# Patient Record
Sex: FEMALE | Race: BLACK OR AFRICAN AMERICAN | HISPANIC OR LATINO | Employment: FULL TIME | URBAN - METROPOLITAN AREA
[De-identification: names, ages, dates, MRNs, and addresses within clinical notes are randomized per-mention and may not be internally consistent; named-entity substitution may affect disease eponyms.]

---

## 2023-08-26 ENCOUNTER — ANCILLARY PROCEDURE (OUTPATIENT)
Dept: GENERAL RADIOLOGY | Facility: CLINIC | Age: 28
End: 2023-08-26
Attending: PHYSICIAN ASSISTANT
Payer: COMMERCIAL

## 2023-08-26 ENCOUNTER — OFFICE VISIT (OUTPATIENT)
Dept: URGENT CARE | Facility: URGENT CARE | Age: 28
End: 2023-08-26
Payer: COMMERCIAL

## 2023-08-26 VITALS
OXYGEN SATURATION: 99 % | DIASTOLIC BLOOD PRESSURE: 66 MMHG | WEIGHT: 183.5 LBS | SYSTOLIC BLOOD PRESSURE: 106 MMHG | RESPIRATION RATE: 16 BRPM | TEMPERATURE: 99.2 F | HEART RATE: 96 BPM

## 2023-08-26 DIAGNOSIS — M79.632 PAIN OF LEFT FOREARM: ICD-10-CM

## 2023-08-26 DIAGNOSIS — M25.522 LEFT ELBOW PAIN: Primary | ICD-10-CM

## 2023-08-26 DIAGNOSIS — M25.522 LEFT ELBOW PAIN: ICD-10-CM

## 2023-08-26 PROCEDURE — 99203 OFFICE O/P NEW LOW 30 MIN: CPT | Performed by: PHYSICIAN ASSISTANT

## 2023-08-26 PROCEDURE — 73080 X-RAY EXAM OF ELBOW: CPT | Mod: TC | Performed by: RADIOLOGY

## 2023-08-26 PROCEDURE — 73090 X-RAY EXAM OF FOREARM: CPT | Mod: TC | Performed by: RADIOLOGY

## 2023-08-26 NOTE — PROGRESS NOTES
ASSESSMENT/PLAN:    8/27/23 -PLEASE SEE BELOW TELEPHONE ENCOUNTER ADDENDUM:     Timothy MORRIS    8/27/23  9:49 AM  Note     Please call patient to let her know the radiologist reading of her x-rays from yesterday's urgent care visit--show the radiologist does suspect a fracture (broken bone). The radiologist does not see a definite fracture, but highly suspects a fracture.      The area the radiologist is concerned about is the very same area I showed patient on her x-rays yesterday.      PLAN :     I advise she follow-up with an orthopedic doctor tomorrow for further evaluation and treatment.      If she is unable to get an appointment with the referral I provided, she could consider going to an orthopedic urgent care (such as [facility])                  Narrative & Impression   EXAM: XR FOREARM LEFT 2 VIEWS, XR ELBOW LEFT G/E 3 VIEWS  LOCATION: [facility]  DATE: 8/26/2023     INDICATION: Elbow pain.  COMPARISON: None.                                                                      IMPRESSION: Large elbow joint effusion, indicating an intra-articular fracture in the elbow, most likely involving the radial head or neck. The fracture itself is not definitely visualized on either the forearm or elbow exam. Joint alignment is normal.   No other acute bone or joint abnormality in the left elbow or forearm.                (M25.522) Left elbow pain  (primary encounter diagnosis)    MDM: Acute pain and swelling left elbow related to fall.  Sprain, bony contusion and occult fracture are in the differential.  No definite fracture on x-ray by my impression today.  Patient is educated about potential occult fractures and delayed x-ray findings.  I did review 1 area of radial head on x-ray with patient and her sister today (and have advised she watch closely for the follow-up radiologist report).    Plan: XR Elbow Left G/E 3 Views, Orthopedic         Referral                August 26,  "2023 [facility]:     1. Please use the sling I provided, intermittently, over the next several days.  Intentionally remove brace frequently throughout the day to work on gentle range of motion exercises, as tolerated.     2. Ice and elevate your elbow/forearm over the next 2-3 days (20 min every 2-3 hours)     3. You may use Ibuprofen (600 mg), three times daily, with food, for next 3-5 days as tolerated.      4.  Follow-up with primary care provider or orthopedic doctor (a referral is provided)  if symptoms not significantly improving over the next week.  Follow-up sooner if any worsening of symptoms or if any new symptoms develop (see handout provided).     5. Follow-up immediately for medical re-evaluation if you develop any increased pain, redness or swelling.  Follow-up immediately if fingers or hand becomes cold, blue, numb, or tingly.       (M79.632) Pain of left forearm    Plan: XR Forearm Left 2 Views, Orthopedic          Referral        This progress note has been dictated, with use of voice recognition software. Any grammatical, typographical, or context errors are unintentional and inherent to use of voice recognition software.    -------------  Chief Complaint   Patient presents with    Arm Injury     Today, fell backwards while roller skating and lands on left elbow-pain in forearm, swelling     Lisa Montenegro is a right hand dominant 27-year-old female who presents to urgent care today for evaluation of left elbow and forearm pain and swelling related to an injury she sustained while rollerskating approximately 2.5 hours ago.    HPI: Patient states she fell backwards (onto a wooden floor) while rollerskating today.  It sounds like her arm was in a \"bent elbow) position when she fell to the floor.  She reports limited range of motion, pain, and swelling since the above.    With her elbow in a 90 degree flexed position patient states pain is 1/10  When she attempts any pronation " supination patient reports pain is 2.5/10  When she attempts to fully flex or fully extend her elbow she reports pain is 8/10    Approximately 2 pm--about 2.5 hours ago     Fell backwards--onto wooden floor with bent elbow/struck ulnar side of wrist hard and also felt crack in left elbow     Specifically denies any cold, blue, icy feeling fingers.  Denies any numbness or tingling of affected upper extremity.  No associated head injury, neck injury, or other acute injuries.    OBJECTIVE:  /66   Pulse 96   Temp 99.2  F (37.3  C)   Resp 16   Wt 83.2 kg (183 lb 8 oz)   SpO2 99%      GENERAL:  Alert. NAD.     LUE Exam: No obvious deformity.  Patient does have generalized swelling over elbow.  No bruising.  No lacerations.  Patient is globally tender over the elbow joint and proximal ulna.  She has very limited range of motion at the elbow due to pain.  Therefore, I intentionally did not further pursue range of motion today.  Patient is able to just demonstrate FROM wrist, hand, and fingers. Exam of shoulder, wrist, hand, digits and forearm is otherwise unremarkable.      SKIN: No laceration, abrasion, hematoma, bruising or swelling     NEURO: Sensation intact to light touch along entire UE and into all fingertips (including radial aspect, ulnar aspect and fingertip) today. Alert and oriented.  Normal speech and mentation.  CN II/XII grossly intact.  Gait within normal limits.        VASCULAR: No compromise to distal circulation. Brachial and radial pulses are 2+ and equal to that of unaffected extremity. Good/brisk capillary refill to all digits confirmed today.      XR LEFT ELBOW AND LEFT WRIST:  I reviewed my impression--No definite acute fracture--but I did show patient the radial head area on x-ray today and advised she will need to watch to see what radiologist thinks of that specific area.  No acute malalignment--along with actual x-ray images, with patient during her office visit today.  Radiologist  over-read pending. Patient will need to be called if there area any acute findings on radiologist over-read.

## 2023-08-26 NOTE — PATIENT INSTRUCTIONS
August 26, 2023 [facility] Plan:     1. Please use the sling I provided, intermittently, over the next several days.  Intentionally remove brace frequently throughout the day to work on gentle range of motion exercises, as tolerated.     2. Ice and elevate your elbow/forearm over the next 2-3 days (20 min every 2-3 hours)     3. You may use Ibuprofen (600 mg), three times daily, with food, for next 3-5 days as tolerated.      4.  Follow-up with primary care provider or orthopedic doctor (a referral is provided)  if symptoms not significantly improving over the next week.  Follow-up sooner if any worsening of symptoms or if any new symptoms develop (see handout provided).     5. Follow-up immediately for medical re-evaluation if you develop any increased pain, redness or swelling.  Follow-up immediately if fingers or hand becomes cold, blue, numb, or tingly.

## 2023-08-27 ENCOUNTER — NURSE TRIAGE (OUTPATIENT)
Dept: NURSING | Facility: CLINIC | Age: 28
End: 2023-08-27
Payer: COMMERCIAL

## 2023-08-27 ENCOUNTER — TELEPHONE (OUTPATIENT)
Dept: URGENT CARE | Facility: URGENT CARE | Age: 28
End: 2023-08-27
Payer: COMMERCIAL

## 2023-08-27 NOTE — TELEPHONE ENCOUNTER
Called and left another  for pt to call back.    RD Browne, [facility] August 27, 2023 9:52 AM

## 2023-08-27 NOTE — TELEPHONE ENCOUNTER
Pt called back and information was relayed to pt by [facility]NA. No further action needed.    RD Browne, [facility] August 27, 2023 12:08 PM

## 2023-08-27 NOTE — TELEPHONE ENCOUNTER
Please call patient to let her know the radiologist reading of her x-rays from yesterday's urgent care visit--show the radiologist does suspect a fracture (broken bone). The radiologist does not see a definite fracture, but highly suspects a fracture.     The area the radiologist is concerned about is the very same area I showed patient on her x-rays yesterday.     PLAN :    I advise she follow-up with an orthopedic doctor tomorrow for further evaluation and treatment.     If she is unable to get an appointment with the referral I provided, she could consider going to an orthopedic urgent care (such as [facility])             Narrative & Impression   EXAM: XR FOREARM LEFT 2 VIEWS, XR ELBOW LEFT G/E 3 VIEWS  LOCATION: [facility]  DATE: 8/26/2023     INDICATION: Elbow pain.  COMPARISON: None.                                                                      IMPRESSION: Large elbow joint effusion, indicating an intra-articular fracture in the elbow, most likely involving the radial head or neck. The fracture itself is not definitely visualized on either the forearm or elbow exam. Joint alignment is normal.   No other acute bone or joint abnormality in the left elbow or forearm.

## 2023-08-27 NOTE — TELEPHONE ENCOUNTER
Pt is phoning stating that she missed a phone call concerning her recent x-ray of her left arm     Writer can see providers and radiologists note and relayed information to pt as follows:    Please call patient to let her know the radiologist reading of her x-rays from yesterday's urgent care visit--show the radiologist does suspect a fracture (broken bone). The radiologist does not see a definite fracture, but highly suspects a fracture.      The area the radiologist is concerned about is the very same area I showed patient on her x-rays yesterday.      PLAN :     I advise she follow-up with an orthopedic doctor tomorrow for further evaluation and treatment.      If she is unable to get an appointment with the referral I provided, she could consider going to an orthopedic urgent care (such as [facility])              Narrative & Impression   EXAM: XR FOREARM LEFT 2 VIEWS, XR ELBOW LEFT G/E 3 VIEWS  LOCATION: [facility]  DATE: 8/26/2023     INDICATION: Elbow pain.  COMPARISON: None.                                                                      IMPRESSION: Large elbow joint effusion, indicating an intra-articular fracture in the elbow, most likely involving the radial head or neck. The fracture itself is not definitely visualized on either the forearm or elbow exam. Joint alignment is normal.   No other acute bone or joint abnormality in the left elbow or forearm.      Writer also went over where the closest [facility] is for pt and provided phone number and address.    No triage       Yisel Polk RN  [facility] Nurse Advisor  11:38 AM 8/27/2023      Reason for Disposition   Health Information question, no triage required and triager able to answer question    Additional Information   Negative: [1] Caller is not with the adult (patient) AND [2] reporting urgent symptoms   Negative: Lab result questions   Negative: Medication questions   Negative: Caller can't  be reached by phone   Negative: Caller has already spoken to PCP or another triager   Negative: RN needs further essential information from caller in order to complete triage   Negative: Requesting regular office appointment   Negative: [1] Caller requesting NON-URGENT health information AND [2] PCP's office is the best resource    Protocols used: Information Only Call - No Triage-A-

## 2023-09-21 ENCOUNTER — OFFICE VISIT (OUTPATIENT)
Dept: FAMILY MEDICINE | Facility: CLINIC | Age: 28
End: 2023-09-21
Payer: COMMERCIAL

## 2023-09-21 VITALS
WEIGHT: 180.9 LBS | TEMPERATURE: 98.5 F | RESPIRATION RATE: 14 BRPM | BODY MASS INDEX: 32.05 KG/M2 | OXYGEN SATURATION: 97 % | HEART RATE: 64 BPM | DIASTOLIC BLOOD PRESSURE: 60 MMHG | HEIGHT: 63 IN | SYSTOLIC BLOOD PRESSURE: 106 MMHG

## 2023-09-21 DIAGNOSIS — Z12.4 CERVICAL CANCER SCREENING: ICD-10-CM

## 2023-09-21 DIAGNOSIS — Z11.4 SCREENING FOR HIV (HUMAN IMMUNODEFICIENCY VIRUS): Primary | ICD-10-CM

## 2023-09-21 DIAGNOSIS — Z00.00 ROUTINE GENERAL MEDICAL EXAMINATION AT A HEALTH CARE FACILITY: ICD-10-CM

## 2023-09-21 DIAGNOSIS — Z11.59 NEED FOR HEPATITIS C SCREENING TEST: ICD-10-CM

## 2023-09-21 PROCEDURE — 90471 IMMUNIZATION ADMIN: CPT | Performed by: NURSE PRACTITIONER

## 2023-09-21 PROCEDURE — 99395 PREV VISIT EST AGE 18-39: CPT | Mod: 25 | Performed by: NURSE PRACTITIONER

## 2023-09-21 PROCEDURE — 90472 IMMUNIZATION ADMIN EACH ADD: CPT | Performed by: NURSE PRACTITIONER

## 2023-09-21 PROCEDURE — 90715 TDAP VACCINE 7 YRS/> IM: CPT | Performed by: NURSE PRACTITIONER

## 2023-09-21 PROCEDURE — 90651 9VHPV VACCINE 2/3 DOSE IM: CPT | Performed by: NURSE PRACTITIONER

## 2023-09-21 PROCEDURE — G0145 SCR C/V CYTO,THINLAYER,RESCR: HCPCS | Performed by: NURSE PRACTITIONER

## 2023-09-21 ASSESSMENT — ENCOUNTER SYMPTOMS
PARESTHESIAS: 0
WEAKNESS: 0
NERVOUS/ANXIOUS: 0
MYALGIAS: 0
ARTHRALGIAS: 0
JOINT SWELLING: 0
DIARRHEA: 0
DYSURIA: 0
HEADACHES: 0
EYE PAIN: 0
PALPITATIONS: 0
DIZZINESS: 0
ABDOMINAL PAIN: 0
HEMATURIA: 0
SORE THROAT: 0
HEARTBURN: 0
CONSTIPATION: 0
BREAST MASS: 0
HEMATOCHEZIA: 0
COUGH: 0
NAUSEA: 0
CHILLS: 0
SHORTNESS OF BREATH: 0
FEVER: 0
FREQUENCY: 0

## 2023-09-21 ASSESSMENT — PAIN SCALES - GENERAL: PAINLEVEL: NO PAIN (0)

## 2023-09-21 NOTE — PROGRESS NOTES
SUBJECTIVE:   CC: Lisa Ba is an 28 year old who presents for preventive health visit.       2023     7:57 AM   Additional Questions   Roomed by RD Martinez   Accompanied by Self       Healthy Habits:     Getting at least 3 servings of Calcium per day:  Yes    Bi-annual eye exam:  NO    Dental care twice a year:  NO    Sleep apnea or symptoms of sleep apnea:  None    Diet:  Breakfast skipped    Frequency of exercise:  1 day/week    Duration of exercise:  15-30 minutes    Taking medications regularly:  Not Applicable    Medication side effects:  Not applicable    Additional concerns today:  No    She is here for physical has no concerns  No breast cancer in family  No sexually active, will get HPV and Today.    Today's PHQ-2 Score:       2023     7:44 AM   PHQ-2 (  [employer])   Q1: Little interest or pleasure in doing things 0   Q2: Feeling down, depressed or hopeless 0   PHQ-2 Score 0   Q1: Little interest or pleasure in doing things Not at all   Q2: Feeling down, depressed or hopeless Not at all   PHQ-2 Score 0               Have you ever done Advance Care Planning? (For example, a Health Directive, POLST, or a discussion with a medical provider or your loved ones about your wishes): No, advance care planning information given to patient to review.  Patient declined advance care planning discussion at this time.    Social History     Tobacco Use    Smoking status: Never    Smokeless tobacco: Never   Substance Use Topics    Alcohol use: Not on file             2023     7:44 AM   Alcohol Use   Prescreen: >3 drinks/day or >7 drinks/week? No     Reviewed orders with patient.  Reviewed health maintenance and updated orders accordingly - Yes  Lab work is in process    Breast Cancer Screenin/21/2023     7:51 AM   Breast CA Risk Assessment (FHS-7)   Do you have a family history of breast, colon, or ovarian cancer? No / Unknown       click delete button to remove this line now  Patient  "under 40 years of age: Routine Mammogram Screening not recommended.   Pertinent mammograms are reviewed under the imaging tab.    History of abnormal Pap smear: NO - age 21-29 PAP every 3 years recommended     Reviewed and updated as needed this visit by clinical staff   Tobacco  Allergies  Meds              Reviewed and updated as needed this visit by Provider     Meds  Problems  Med Hx  Surg Hx          No past medical history on file.   No past surgical history on file.    Review of Systems   Constitutional:  Negative for chills and fever.   HENT:  Negative for congestion, ear pain, hearing loss and sore throat.    Eyes:  Negative for pain and visual disturbance.   Respiratory:  Negative for cough and shortness of breath.    Cardiovascular:  Negative for chest pain, palpitations and peripheral edema.   Gastrointestinal:  Negative for abdominal pain, constipation, diarrhea, heartburn, hematochezia and nausea.   Breasts:  Negative for tenderness, breast mass and discharge.   Genitourinary:  Negative for dysuria, frequency, genital sores, hematuria, pelvic pain, urgency, vaginal bleeding and vaginal discharge.   Musculoskeletal:  Negative for arthralgias, joint swelling and myalgias.   Skin:  Negative for rash.   Neurological:  Negative for dizziness, weakness, headaches and paresthesias.   Psychiatric/Behavioral:  Negative for mood changes. The patient is not nervous/anxious.           OBJECTIVE:   /60 (BP Location: Right arm, Patient Position: Sitting, Cuff Size: Adult Regular)   Pulse 64   Temp 98.5  F (36.9  C) (Oral)   Resp 14   Ht 1.6 m (5' 3\")   Wt 82.1 kg (180 lb 14.4 oz)   LMP 08/23/2023 (Approximate)   SpO2 97%   BMI 32.04 kg/m    Physical Exam  GENERAL: healthy, alert and no distress  EYES: Eyes grossly normal to inspection, PERRL and conjunctivae and sclerae normal  HENT: ear canals and TM's normal, nose and mouth without ulcers or lesions  NECK: no adenopathy, no asymmetry, masses, " "or scars and thyroid normal to palpation  RESP: lungs clear to auscultation - no rales, rhonchi or wheezes  BREAST: normal without masses, tenderness or nipple discharge and no palpable axillary masses or adenopathy  CV: regular rate and rhythm, normal S1 S2, no S3 or S4, no murmur, click or rub, no peripheral edema and peripheral pulses strong  ABDOMEN: soft, nontender, no hepatosplenomegaly, no masses and bowel sounds normal   (female): normal female external genitalia, normal urethral meatus, vaginal mucosa pink, moist, well rugated, and normal cervix/adnexa/uterus without masses or discharge  MS: no gross musculoskeletal defects noted, no edema  SKIN: no suspicious lesions or rashes  NEURO: Normal strength and tone, mentation intact and speech normal  PSYCH: mentation appears normal, affect normal/bright    Diagnostic Test Results:  Labs reviewed in Epic    ASSESSMENT/PLAN:       ICD-10-CM    1. Screening for HIV (human immunodeficiency virus)  Z11.4 HIV Antigen Antibody Combo      2. Need for hepatitis C screening test  Z11.59 Hepatitis C Screen Reflex to HCV RNA Quant and Genotype      3. Cervical cancer screening  Z12.4 Pap Screen reflex to HPV if ASCUS - recommend age 25 - 29      4. Routine general medical examination at a health care facility  Z00.00           Patient has been advised of split billing requirements and indicates understanding: Yes      COUNSELING:  Reviewed preventive health counseling, as reflected in patient instructions      BMI:   Estimated body mass index is 32.04 kg/m  as calculated from the following:    Height as of this encounter: 1.6 m (5' 3\").    Weight as of this encounter: 82.1 kg (180 lb 14.4 oz).   Weight management plan: Discussed healthy diet and exercise guidelines      She reports that she has never smoked. She has never used smokeless tobacco.          Katina Webb NP  [facility]  "

## 2023-09-25 LAB
BKR LAB AP GYN ADEQUACY: NORMAL
BKR LAB AP GYN INTERPRETATION: NORMAL
BKR LAB AP HPV REFLEX: NORMAL
BKR LAB AP LMP: NORMAL
BKR LAB AP PREVIOUS ABNORMAL: NORMAL
PATH REPORT.COMMENTS IMP SPEC: NORMAL
PATH REPORT.COMMENTS IMP SPEC: NORMAL
PATH REPORT.RELEVANT HX SPEC: NORMAL

## 2024-12-15 ENCOUNTER — HEALTH MAINTENANCE LETTER (OUTPATIENT)
Age: 29
End: 2024-12-15

## 2025-07-03 ENCOUNTER — OFFICE VISIT (OUTPATIENT)
Dept: FAMILY MEDICINE | Facility: CLINIC | Age: 30
End: 2025-07-03
Payer: COMMERCIAL

## 2025-07-03 VITALS
TEMPERATURE: 98.9 F | OXYGEN SATURATION: 99 % | BODY MASS INDEX: 34.28 KG/M2 | DIASTOLIC BLOOD PRESSURE: 63 MMHG | HEART RATE: 76 BPM | WEIGHT: 200.8 LBS | SYSTOLIC BLOOD PRESSURE: 81 MMHG | HEIGHT: 64 IN | RESPIRATION RATE: 18 BRPM

## 2025-07-03 DIAGNOSIS — L73.9 FOLLICULITIS: ICD-10-CM

## 2025-07-03 DIAGNOSIS — Z23 ENCOUNTER FOR IMMUNIZATION: ICD-10-CM

## 2025-07-03 ASSESSMENT — PAIN SCALES - GENERAL: PAINLEVEL_OUTOF10: NO PAIN (0)

## 2025-07-03 NOTE — PROGRESS NOTES
"  Assessment & Plan     (L73.9) Folliculitis  Comment: On Labia majora , small <1cm swelling palpated ,   Explained patient it could be due to resolving infection or a small lymph node   Discussed monitoring , recommend to contact if it fails to resolve .  Discussed warm compresses     (Z23) Encounter for immunization  Comment:   Plan: HPV9 (GARDASIL 9)                  BMI  Estimated body mass index is 35.01 kg/m  as calculated from the following:    Height as of this encounter: 1.613 m (5' 3.5\").    Weight as of this encounter: 91.1 kg (200 lb 12.8 oz).   Weight management plan: Discussed healthy diet and exercise guidelines        Subjective   Lisa Ba is a 29 year old, presenting for the following health issues:  Mass (Pt reports a small solid-sandra painless lump in left labia majora. Pt reports no pain but would like to know what this is and prevention steps to take.)        7/3/2025     9:19 AM   Additional Questions   Roomed by Margaret Graff MA Extern   Accompanied by Sister (Anahi)     Mass    History of Present Illness       Reason for visit:  New symptom, vaginal/groin area  Symptom onset:  Today  Symptoms include:  Small solid-sandra painless lump in left labia majora  Symptom intensity:  Mild  Symptom progression:  Staying the same  Had these symptoms before:  No  What makes it worse:  No. No pain, but would like to know what this is and prevention steps to take.  What makes it better:  No. No pain, but would like to know what this is and prevention steps to take.   She is taking medications regularly.            Review of Systems  CONSTITUTIONAL: NEGATIVE for fever, chills, change in weight  ENT/MOUTH: NEGATIVE for ear, mouth and throat problems  Lump on labia majora .        Objective    BP (!) 81/63 (BP Location: Right arm, Patient Position: Sitting, Cuff Size: Adult Large)   Pulse 76   Temp 98.9  F (37.2  C) (Oral)   Resp 18   Ht 1.613 m (5' 3.5\")   Wt 91.1 kg (200 lb 12.8 oz)   LMP " 07/03/2025 (Exact Date)   SpO2 99%   BMI 35.01 kg/m    Body mass index is 35.01 kg/m .  Physical Exam  Abdominal:      General: Abdomen is flat.   Genitourinary:     Comments: Labia majora 0.5 cm left   Skin:     General: Skin is warm.   Neurological:      General: No focal deficit present.              Signed Electronically by: Tracy Bedolla MD